# Patient Record
Sex: FEMALE | Race: WHITE
[De-identification: names, ages, dates, MRNs, and addresses within clinical notes are randomized per-mention and may not be internally consistent; named-entity substitution may affect disease eponyms.]

---

## 2018-12-22 ENCOUNTER — HOSPITAL ENCOUNTER (EMERGENCY)
Dept: HOSPITAL 76 - ED | Age: 63
Discharge: HOME | End: 2018-12-22
Payer: COMMERCIAL

## 2018-12-22 VITALS — DIASTOLIC BLOOD PRESSURE: 78 MMHG | SYSTOLIC BLOOD PRESSURE: 130 MMHG

## 2018-12-22 DIAGNOSIS — E11.9: ICD-10-CM

## 2018-12-22 DIAGNOSIS — R07.9: Primary | ICD-10-CM

## 2018-12-22 DIAGNOSIS — I51.7: ICD-10-CM

## 2018-12-22 DIAGNOSIS — I10: ICD-10-CM

## 2018-12-22 LAB
ANION GAP SERPL CALCULATED.4IONS-SCNC: 11 MMOL/L (ref 6–13)
BASOPHILS NFR BLD AUTO: 0.1 10^3/UL (ref 0–0.1)
BASOPHILS NFR BLD AUTO: 1 %
BUN SERPL-MCNC: 22 MG/DL (ref 6–20)
CALCIUM UR-MCNC: 9.1 MG/DL (ref 8.5–10.3)
CHLORIDE SERPL-SCNC: 104 MMOL/L (ref 101–111)
CO2 SERPL-SCNC: 23 MMOL/L (ref 21–32)
CREAT SERPLBLD-SCNC: 0.8 MG/DL (ref 0.4–1)
EOSINOPHIL # BLD AUTO: 0.2 10^3/UL (ref 0–0.7)
EOSINOPHIL NFR BLD AUTO: 1.7 %
ERYTHROCYTE [DISTWIDTH] IN BLOOD BY AUTOMATED COUNT: 12.7 % (ref 12–15)
GFRSERPLBLD MDRD-ARVRAT: 72 ML/MIN/{1.73_M2} (ref 89–?)
GLUCOSE SERPL-MCNC: 225 MG/DL (ref 70–100)
HGB UR QL STRIP: 14.1 G/DL (ref 12–16)
LYMPHOCYTES # SPEC AUTO: 4.1 10^3/UL (ref 1.5–3.5)
LYMPHOCYTES NFR BLD AUTO: 37.9 %
MCH RBC QN AUTO: 31.9 PG (ref 27–31)
MCHC RBC AUTO-ENTMCNC: 34.9 G/DL (ref 32–36)
MCV RBC AUTO: 91.5 FL (ref 81–99)
MONOCYTES # BLD AUTO: 0.7 10^3/UL (ref 0–1)
MONOCYTES NFR BLD AUTO: 6.1 %
NEUTROPHILS # BLD AUTO: 5.8 10^3/UL (ref 1.5–6.6)
NEUTROPHILS # SNV AUTO: 10.9 X10^3/UL (ref 4.8–10.8)
NEUTROPHILS NFR BLD AUTO: 53.3 %
PDW BLD AUTO: 8.3 FL (ref 7.9–10.8)
PLATELET # BLD: 250 10^3/UL (ref 130–450)
RBC MAR: 4.43 10^6/UL (ref 4.2–5.4)
SODIUM SERPLBLD-SCNC: 138 MMOL/L (ref 135–145)

## 2018-12-22 PROCEDURE — 71046 X-RAY EXAM CHEST 2 VIEWS: CPT

## 2018-12-22 PROCEDURE — 80048 BASIC METABOLIC PNL TOTAL CA: CPT

## 2018-12-22 PROCEDURE — 99283 EMERGENCY DEPT VISIT LOW MDM: CPT

## 2018-12-22 PROCEDURE — 85025 COMPLETE CBC W/AUTO DIFF WBC: CPT

## 2018-12-22 PROCEDURE — 84484 ASSAY OF TROPONIN QUANT: CPT

## 2018-12-22 PROCEDURE — 36415 COLL VENOUS BLD VENIPUNCTURE: CPT

## 2018-12-22 PROCEDURE — 93005 ELECTROCARDIOGRAM TRACING: CPT

## 2018-12-22 PROCEDURE — 99284 EMERGENCY DEPT VISIT MOD MDM: CPT

## 2018-12-22 NOTE — XRAY REPORT
Reason:  chest pain

Procedure Date:  12/22/2018   

Accession Number:  [id number] / [id number]                    

Procedure:  XR  - Chest 2 View X-Ray CPT Code:  03625

 

FULL RESULT:

 

 

EXAM:

CHEST RADIOGRAPHY

 

EXAM DATE: 12/22/2018 05:53 AM.

 

CLINICAL HISTORY: Chest pain.

 

COMPARISON: None.

 

TECHNIQUE: 2 views.

 

FINDINGS:

Lungs/Pleura: No focal opacities evident. No pleural effusion. No 

pneumothorax. Normal volumes.

 

Mediastinum: Heart and mediastinal contours are unremarkable.

 

Other: Left subclavian dual-chamber pacemaker.

IMPRESSION: No evidence of acute cardiopulmonary disease.

 

[facility]

## 2018-12-22 NOTE — ED PHYSICIAN DOCUMENTATION
PD HPI CHEST PAIN





- Stated complaint


Stated Complaint: CHEST PAIN





- Chief complaint


Chief Complaint: Cardiac





- History obtained from


History obtained from: Patient





- History of Present Illness


Timing - onset: Last night


Timing - onset during: Light activity


Timing - details: Abrupt onset, Intermittant, Waxing and waning


Quality: Pressure ("heaviness" (per patient))


Location: Substernal


Radiation: Other (left chest and shoulder)


Improved by: Nothing


Worsened by: Other (no exacerbating fators for chest pain, dizziness worse with 

standing, ambulating)


Associated symptoms: Shortness of air (mild)


Similar symptoms before: Has not had sx before


Recently seen: Not recently seen





- Additional information


Additional information: 





c/o nonproductive cough x 4-5 weeks. Last night, she had chest heaviness and 

dizziness with mild dyspnea. This improved but recurred this morning. Had stress

test approximately 4-5 years ago, patient says results were reassuring.





Review of Systems


Constitutional: reports: Reviewed and negative


Cardiac: reports: Chest pain / pressure.  denies: Palpitations, Pedal edema


Respiratory: reports: Dyspnea (mild, intermittent), Cough.  denies: Hemoptysis, 

Wheezing


GI: reports: Reviewed and negative


Neurologic: denies: Generalized weakness





PD PAST MEDICAL HISTORY





- Past Medical History


Past Medical History: Yes


Cardiovascular: Hypertension, Other


Respiratory: None


Neuro: None


Endocrine/Autoimmune: Type 2 diabetes


GI: GERD


GYN: None


: None


HEENT: None


Psych: None


Musculoskeletal: None


Derm: None


Other Past Medical History: [employer]...





- Past Surgical History


Past Surgical History: Yes


General: Other





- Allergies


Allergies/Adverse Reactions: 


                                    Allergies











Allergy/AdvReac Type Severity Reaction Status Date / Time


 


Iodinated Contrast- Oral and AdvReac  Rash Verified 12/22/18 05:25





IV Dye     


 


metoclopramide [From Reglan] AdvReac  Unknown Verified 12/22/18 05:25














- Social History


Does the pt smoke?: No


Smoking Status: Never smoker


Does the pt drink ETOH?: Yes


Does the pt have substance abuse?: No





- Immunizations


Immunizations are current?: Yes





- POLST


Patient has POLST: No





PD ED PE NORMAL





- Vitals


Vital signs reviewed: Yes





- General


General: Alert and oriented X 3, No acute distress, Well developed/nourished





- HEENT


HEENT: Moist mucous membranes





- Neck


Neck: Supple, no meningeal sign





- Cardiac


Cardiac: RRR, No murmur, No gallop, No rub





- Respiratory


Respiratory: No respiratory distress, Clear bilaterally





- Abdomen


Abdomen: Soft, Non tender





- Derm


Derm: Normal color, Warm and dry





- Extremities


Extremities: No edema





Results





- Vitals


Vitals: 


                               Vital Signs - 24 hr











  12/22/18 12/22/18 12/22/18





  05:18 05:32 06:10


 


Temperature 36.5 C  


 


Heart Rate 86 61 62


 


Respiratory 18 16 18





Rate   


 


Blood Pressure 185/91 H 146/70 H 141/70 H


 


O2 Saturation 99 100 98














  12/22/18 12/22/18 12/22/18





  06:48 07:20 08:30


 


Temperature   


 


Heart Rate 60 60 59 L


 


Respiratory 17 14 14





Rate   


 


Blood Pressure 137/74 H 142/72 H 130/78


 


O2 Saturation 97 97 97








                                     Oxygen











O2 Source                      Room air

















- EKG (time done)


  ** No standard instances


Rate: Rate (enter#) (64)


Rhythm: NSR


Axis: LAD


Intervals: Normal MI


QRS: LVH


Ischemia: Normal ST segments





- Labs


Labs: 


                                Laboratory Tests











  12/22/18 12/22/18 12/22/18





  05:30 05:30 05:30


 


WBC  10.9 H  


 


RBC  4.43  


 


Hgb  14.1  


 


Hct  40.5  


 


MCV  91.5  


 


MCH  31.9 H  


 


MCHC  34.9  


 


RDW  12.7  


 


Plt Count  250  


 


MPV  8.3  


 


Neut # (Auto)  5.8  


 


Lymph # (Auto)  4.1 H  


 


Mono # (Auto)  0.7  


 


Eos # (Auto)  0.2  


 


Baso # (Auto)  0.1  


 


Absolute Nucleated RBC  0.00  


 


Nucleated RBC %  0.0  


 


Sodium   138 


 


Potassium   4.1 


 


Chloride   104 


 


Carbon Dioxide   23 


 


Anion Gap   11.0 


 


BUN   22 H 


 


Creatinine   0.8 


 


Estimated GFR (MDRD)   72 L 


 


Glucose   225 H 


 


Calcium   9.1 


 


Troponin I    < 0.04














- Rads (name of study)


  ** chest xray


Radiology: Prelim report reviewed, See rad report





PD MEDICAL DECISION MAKING





- ED course


Complexity details: reviewed results, re-evaluated patient, considered 

differential, d/w patient





Departure





- Departure


Disposition: 01 Home, Self Care


Clinical Impression: 


 Chest pain





Condition: Good


Instructions:  ED Chest Pain Atypical Unkn Cause


Forms:  Activity restrictions


Discharge Date/Time: 12/22/18 09:00

## 2019-01-17 ENCOUNTER — HOSPITAL ENCOUNTER (EMERGENCY)
Dept: HOSPITAL 76 - ED | Age: 64
Discharge: HOME | End: 2019-01-17
Payer: COMMERCIAL

## 2019-01-17 VITALS — SYSTOLIC BLOOD PRESSURE: 187 MMHG | DIASTOLIC BLOOD PRESSURE: 81 MMHG

## 2019-01-17 DIAGNOSIS — I10: ICD-10-CM
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
## 2019-01-17 DIAGNOSIS — S02.2XXA: ICD-10-CM

## 2019-01-17 DIAGNOSIS — S09.90XA: Primary | ICD-10-CM

## 2019-01-17 DIAGNOSIS — S63.502A: ICD-10-CM
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
## 2019-01-17 DIAGNOSIS — E11.9: ICD-10-CM

## 2019-01-17 DIAGNOSIS — S00.81XA: ICD-10-CM

## 2019-01-17 DIAGNOSIS — S83.92XA: ICD-10-CM

## 2019-01-17 PROCEDURE — 70450 CT HEAD/BRAIN W/O DYE: CPT

## 2019-01-17 PROCEDURE — 99283 EMERGENCY DEPT VISIT LOW MDM: CPT

## 2019-01-17 PROCEDURE — 73110 X-RAY EXAM OF WRIST: CPT

## 2019-01-17 PROCEDURE — 70486 CT MAXILLOFACIAL W/O DYE: CPT

## 2019-01-17 PROCEDURE — 73564 X-RAY EXAM KNEE 4 OR MORE: CPT

## 2019-01-17 PROCEDURE — 72125 CT NECK SPINE W/O DYE: CPT

## 2019-01-17 PROCEDURE — 12011 RPR F/E/E/N/L/M 2.5 CM/<: CPT

## 2019-01-17 NOTE — XRAY REPORT
Reason:  Fall w pain

Procedure Date:  01/17/2019   

Accession Number:  [id number] / [id number]                    

Procedure:  XR  - Knee 4 View LT CPT Code:  

 

FULL RESULT:

 

 

EXAM:

LEFT KNEE RADIOGRAPHY

 

EXAM DATE: 1/17/2019 08:19 PM.

 

CLINICAL HISTORY: Fall with pain.

 

COMPARISON: None.

 

TECHNIQUE: 4 views.

 

FINDINGS:

Bones: Normal. No fractures or bone lesions.

 

Joints: Normal. No effusion. No subluxations.

 

Soft Tissues: Normal. No soft tissue swelling.

IMPRESSION: Normal knee radiography.

 

[facility]

## 2019-01-17 NOTE — CT REPORT
Reason:  Fall w pain

Procedure Date:  01/17/2019   

Accession Number:  [id number] / [id number]                    

Procedure:  CT  - Facial Bones W/O CPT Code:  

 

FULL RESULT:

 

 

EXAM:

CT MAXILLOFACIAL WITHOUT CONTRAST

 

EXAM DATE: 1/17/2019 08:43 PM.

 

CLINICAL HISTORY: Fall with pain.

 

COMPARISONS: None.

 

TECHNIQUE: Thin-section axial images were acquired of the face without 

contrast. Post-processing: Coronal and sagittal reformats. Other: None.

 

In accordance with CT protocol optimization, one or more of the following 

dose reduction techniques were utilized for this exam: automated exposure 

control, adjustment of mA and/or KV based on patient size, or use of 

iterative reconstructive technique.

 

FINDINGS: Acute comminuted nasal bone fracture at the bilateral nasal 

alar bones, mildly displaced, and across the bridge. Left nasal alar bone 

fractures are mildly displaced and angled to the left. Overlying soft 

tissue swelling.

 

No acute orbital findings.

 

Mild left maxillary sinus mucosal thickening. Minimal right maxillary 

sinus mucosal thickening.

IMPRESSION:

1. Acute nasal bone comminuted fracture. See above.

 

[facility]

## 2019-01-17 NOTE — CT REPORT
Reason:  Fall w pain

Procedure Date:  01/17/2019   

Accession Number:  [id number] / [id number]                    

Procedure:  CT  - Cervical Spine W/O CPT Code:  

 

FULL RESULT:

 

 

EXAM:

CT CERVICAL SPINE WITHOUT CONTRAST

 

DATE: 1/17/2019 08:44 PM.

 

HISTORY: Fall with pain.

 

COMPARISONS: None.

 

TECHNIQUE: Thin-section axial images were acquired of the cervical spine 

without contrast. Post-processing: Coronal and sagittal reformats. Other: 

None.

 

In accordance with CT protocol optimization, one or more of the following 

dose reduction techniques were utilized for this exam: automated exposure 

control, adjustment of mA and/or KV based on patient size, or use of 

iterative reconstructive technique.

 

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

 

No evidence for acute fracture.

 

Mild to moderate degenerative disk disease more moderate at the mid and 

lower levels. Mild diffuse bilateral facet arthropathy.

 

Minimal anterolisthesis of T1 on T2 most likely degenerative.

 

No acute soft tissue findings.

IMPRESSION:

1. No evidence for acute fracture.

2. Degenerative changes. See above.

 

[facility]

## 2019-01-17 NOTE — ED PHYSICIAN DOCUMENTATION
History of Present Illness





- Stated complaint


Stated Complaint: GLF





- Chief complaint


Chief Complaint: Trauma Hd/Nk





- History obtained from


History obtained from: Patient





- History of Present Illness


Timing: Prior to arrival


Pain level max: 5


Pain level now: 4


Severity Comments: mild


Quality: dull


Radiates to: none


Improved by: nothing


Worsened by: nothing


Associated symptoms: headache, left wrist pain, left knee pain





- Additonal information


Additional information: 


63-year-old female was on her way to work when she sustained a ground-level fall

onto concrete hitting her bridge of nose, left wrist, left knee.  Unsure about 

loss of consciousness.








Review of Systems


Ten Systems: 10 systems reviewed and negative


Constitutional: reports: Reviewed and negative


Eyes: reports: Reviewed and negative


Ears: reports: Reviewed and negative


Nose: reports: Reviewed and negative


Throat: reports: Reviewed and negative


Cardiac: reports: Reviewed and negative


Respiratory: reports: Reviewed and negative


GI: reports: Reviewed and negative


: reports: Reviewed and negative


Skin: reports: Reviewed and negative


Musculoskeletal: reports: Reviewed and negative


Neurologic: reports: Reviewed and negative


Psychiatric: reports: Reviewed and negative


Endocrine: reports: Reviewed and negative


Immunocompromised: reports: Reviewed and negative





PD PAST MEDICAL HISTORY





- Past Medical History


Cardiovascular: Hypertension, Other


Respiratory: None


Neuro: None


Endocrine/Autoimmune: Type 2 diabetes


GI: GERD


GYN: None


: None


HEENT: None


Psych: None


Musculoskeletal: None


Derm: None


Other Past Medical History: Reviewed and not pertinent





- Past Surgical History


Past Surgical History: Yes


General: Other


Other past surgical history: Reviewed and not pertinent





- Allergies


Allergies/Adverse Reactions: 


                                    Allergies











Allergy/AdvReac Type Severity Reaction Status Date / Time


 


Iodinated Contrast- Oral and AdvReac  Rash Verified 01/17/19 19:56





IV Dye     


 


metoclopramide [From Reglan] AdvReac  Unknown Verified 01/17/19 19:56














- Living Situation


Living Situation: reports: Alone


Living Arrangement: reports: At home





- Social History


Does the pt smoke?: No


Smoking Status: Never smoker


Does the pt drink ETOH?: Yes


Does the pt have substance abuse?: No





- Family History


Family history: reports: Other (Reviewed and not pertinent)





- Immunizations


Immunizations are current?: Yes





- POLST


Patient has POLST: No





PD ED PE NORMAL





- Vitals


Vital signs reviewed: Yes





- General


General: Alert and oriented X 3, No acute distress





- HEENT


HEENT: PERRL, Other (Abrasion on bridge of nose, dried blood in the nasopharynx,

no nasal septal hematoma.)





- Neck


Neck: Supple, no meningeal sign





- Cardiac


Cardiac: RRR, No murmur





- Respiratory


Respiratory: Clear bilaterally





- Abdomen


Abdomen: Normal bowel sounds, Soft, Non tender, Non distended





- Derm


Derm: Warm and dry





- Extremities


Extremities: No deformity, Other (Left wrist and left knee tenderness.)





- Neuro


Neuro: Alert and oriented X 3





- Psych


Psych: Normal mood, Normal affect





Results





- Vitals


Vitals: 


                               Vital Signs - 24 hr











  01/17/19





  19:52


 


Temperature 35.5 C L


 


Heart Rate 70


 


Respiratory 20





Rate 


 


Blood Pressure 187/81 H


 


O2 Saturation 98








                                     Oxygen











O2 Source                      Room air

















- Rads (name of study)


  ** CT Face


Radiology: Final report received (Comminuted nasal fracture)





  ** CT Head


Radiology: Final report received (WNL)





  ** CT Cervical Spine


Radiology: Final report received (Degenerative changes)





  ** Left wrist XRAY


Radiology: Final report received (WNL)





  ** Left Knee


Radiology: Final report received (WNL)





Procedures





- Laceration (location)


  ** Face


Wound type: Stellate


Neurovascular status: Sensory intact


Tendon involvement: Tendon intact


Wound Preparation: Irrigated copiously NS


Skin layer closure: Dermabond


Other: Patient tolerated well


Complexity: Simple





PD MEDICAL DECISION MAKING





- ED course


ED course: 


63-year-old female with ground-level fall.  Facial CT shows comminuted nasal 

fracture.  Head and neck CT unremarkable.  X-ray of the left wrist and left knee

unremarkable.  Patient discharged with concussion precautions.  Dermabond 

applied to the abrasion over the bridge of the nose.








Departure





- Departure


Disposition: 01 Home, Self Care


Clinical Impression: 


Closed head injury


Qualifiers:


 Encounter type: initial encounter Qualified Code(s): S09.90XA - Unspecified 

injury of head, initial encounter





Nasal bone fracture


Qualifiers:


 Encounter type: initial encounter Fracture type: closed Qualified Code(s): 

S02.2XXA - Fracture of nasal bones, initial encounter for closed fracture





Left wrist sprain


Qualifiers:


 Encounter type: initial encounter Qualified Code(s): S63.502A - Unspecified 

sprain of left wrist, initial encounter





Left knee sprain


Qualifiers:


 Encounter type: initial encounter Involved ligament of knee: unspecified 

ligament Qualified Code(s): S83.92XA - Sprain of unspecified site of left knee, 

initial encounter





Facial abrasion


Qualifiers:


 Encounter type: initial encounter Qualified Code(s): S00.81XA - Abrasion of 

other part of head, initial encounter





Instructions:  ED Head Injury Closed Ch, ED Knee Pain UKO, ED Sprain Wrist


Follow-Up: 


Your, PCP [Other]


Comments: 


Follow-up with PCP within 24 hours.  Return with worsening symptoms.  Take 

Tylenol and ibuprofen as needed for pain.

## 2019-01-17 NOTE — CT REPORT
Reason:  Fall w pain

Procedure Date:  01/17/2019   

Accession Number:  [id number] / [id number]                    

Procedure:  CT  - Head W/O CPT Code:  

 

FULL RESULT:

 

 

EXAM:

CT HEAD

 

EXAM DATE: 1/17/2019 08:18 PM.

 

CLINICAL HISTORY: Fall with pain.

 

COMPARISON: None.

 

TECHNIQUE: Multiaxial CT images were obtained from the foramen magnum to 

the vertex. Reformats: Sagittal and coronal. IV contrast: None.

 

In accordance with CT protocol optimization, one or more of the following 

dose reduction techniques were utilized for this exam: automated exposure 

control, adjustment of mA and/or KV based on patient size, or use of 

iterative reconstructive technique.

 

FINDINGS:

Parenchyma: No intraparenchymal hemorrhage. No evidence of mass, midline 

shift, or CT findings of infarction. Gray-white differentiation is 

distinct.

Physiologic calcification seen in the bilateral basal ganglia.

 

Extraaxial Spaces: Normal for age. No subdural or epidural collections 

identified.

 

Ventricles: Normal in size and position.

 

Bones: No evidence for skull fracture.

IMPRESSION: No acute intracranial abnormality seen.

 

[facility]

## 2019-01-17 NOTE — XRAY REPORT
Reason:  Fall w pain

Procedure Date:  01/17/2019   

Accession Number:  [id number] / [id number]                    

Procedure:  XR  - Wrist 4 View LT CPT Code:  

 

FULL RESULT:

 

 

EXAM: LEFT WRIST RADIOGRAPHY.

 

EXAM DATE: 01/17/2019 08:18 PM.

 

CLINICAL HISTORY: Fall with pain.

 

COMPARISON: None.

 

TECHNIQUE: 4 views.

 

FINDINGS:

Bones: Normal. No fractures or bone lesions.

 

Joints: Normal. No subluxations.

 

Soft Tissues: Normal. No soft tissue swelling.

IMPRESSION: Normal wrist radiography.

 

[facility]